# Patient Record
Sex: MALE | Race: WHITE | Employment: FULL TIME | URBAN - METROPOLITAN AREA
[De-identification: names, ages, dates, MRNs, and addresses within clinical notes are randomized per-mention and may not be internally consistent; named-entity substitution may affect disease eponyms.]

---

## 2021-04-16 ENCOUNTER — HOSPITAL ENCOUNTER (OUTPATIENT)
Dept: GENERAL RADIOLOGY | Age: 66
Discharge: HOME | End: 2021-04-16

## 2021-04-16 DIAGNOSIS — R05.9 COUGH: ICD-10-CM

## 2021-04-16 DIAGNOSIS — U07.1 COVID-19: ICD-10-CM

## 2021-04-16 DIAGNOSIS — R06.02 SHORTNESS OF BREATH: ICD-10-CM

## 2021-07-22 PROBLEM — I20.9 ANGINA PECTORIS, UNSPECIFIED (HCC): Status: ACTIVE | Noted: 2021-07-22

## 2022-03-19 PROBLEM — I20.9 ANGINA PECTORIS, UNSPECIFIED (HCC): Status: ACTIVE | Noted: 2021-07-22

## 2022-07-28 ENCOUNTER — NURSE ONLY (OUTPATIENT)
Dept: FAMILY MEDICINE CLINIC | Facility: CLINIC | Age: 67
End: 2022-07-28
Payer: COMMERCIAL

## 2022-07-28 DIAGNOSIS — E55.9 VITAMIN D DEFICIENCY: ICD-10-CM

## 2022-07-28 DIAGNOSIS — E78.00 PURE HYPERCHOLESTEROLEMIA: ICD-10-CM

## 2022-07-28 DIAGNOSIS — I10 PRIMARY HYPERTENSION: ICD-10-CM

## 2022-07-28 DIAGNOSIS — Z12.5 SPECIAL SCREENING FOR MALIGNANT NEOPLASM OF PROSTATE: ICD-10-CM

## 2022-07-28 DIAGNOSIS — Z00.00 LABORATORY EXAMINATION ORDERED AS PART OF A ROUTINE GENERAL MEDICAL EXAMINATION: Primary | ICD-10-CM

## 2022-07-28 LAB
25(OH)D3 SERPL-MCNC: 45.6 NG/ML (ref 30–100)
ALBUMIN SERPL-MCNC: 4.1 G/DL (ref 3.2–4.6)
ALBUMIN/GLOB SERPL: 1.2 {RATIO} (ref 1.2–3.5)
ALP SERPL-CCNC: 68 U/L (ref 50–136)
ALT SERPL-CCNC: 33 U/L (ref 12–65)
ANION GAP SERPL CALC-SCNC: 3 MMOL/L (ref 7–16)
AST SERPL-CCNC: 15 U/L (ref 15–37)
BASOPHILS # BLD: 0.1 K/UL (ref 0–0.2)
BASOPHILS NFR BLD: 2 % (ref 0–2)
BILIRUB SERPL-MCNC: 0.7 MG/DL (ref 0.2–1.1)
BILIRUBIN, URINE, POC: NEGATIVE
BLOOD URINE, POC: NEGATIVE
BUN SERPL-MCNC: 22 MG/DL (ref 8–23)
CALCIUM SERPL-MCNC: 9.3 MG/DL (ref 8.3–10.4)
CHLORIDE SERPL-SCNC: 108 MMOL/L (ref 98–107)
CHOLEST SERPL-MCNC: 142 MG/DL
CO2 SERPL-SCNC: 30 MMOL/L (ref 21–32)
CREAT SERPL-MCNC: 1.1 MG/DL (ref 0.8–1.5)
DIFFERENTIAL METHOD BLD: ABNORMAL
EOSINOPHIL # BLD: 0.2 K/UL (ref 0–0.8)
EOSINOPHIL NFR BLD: 2 % (ref 0.5–7.8)
ERYTHROCYTE [DISTWIDTH] IN BLOOD BY AUTOMATED COUNT: 12.9 % (ref 11.9–14.6)
GLOBULIN SER CALC-MCNC: 3.4 G/DL (ref 2.3–3.5)
GLUCOSE SERPL-MCNC: 104 MG/DL (ref 65–100)
GLUCOSE URINE, POC: NEGATIVE
HCT VFR BLD AUTO: 49.3 % (ref 41.1–50.3)
HDLC SERPL-MCNC: 46 MG/DL (ref 40–60)
HDLC SERPL: 3.1 {RATIO}
HGB BLD-MCNC: 16.2 G/DL (ref 13.6–17.2)
IMM GRANULOCYTES # BLD AUTO: 0 K/UL (ref 0–0.5)
IMM GRANULOCYTES NFR BLD AUTO: 0 % (ref 0–5)
KETONES, URINE, POC: NEGATIVE
LDLC SERPL CALC-MCNC: 72.8 MG/DL
LEUKOCYTE ESTERASE, URINE, POC: NEGATIVE
LYMPHOCYTES # BLD: 2.3 K/UL (ref 0.5–4.6)
LYMPHOCYTES NFR BLD: 30 % (ref 13–44)
MCH RBC QN AUTO: 31.2 PG (ref 26.1–32.9)
MCHC RBC AUTO-ENTMCNC: 32.9 G/DL (ref 31.4–35)
MCV RBC AUTO: 94.8 FL (ref 79.6–97.8)
MONOCYTES # BLD: 1 K/UL (ref 0.1–1.3)
MONOCYTES NFR BLD: 13 % (ref 4–12)
NEUTS SEG # BLD: 4 K/UL (ref 1.7–8.2)
NEUTS SEG NFR BLD: 53 % (ref 43–78)
NITRITE, URINE, POC: NEGATIVE
NRBC # BLD: 0 K/UL (ref 0–0.2)
PH, URINE, POC: 6 (ref 4.6–8)
PLATELET # BLD AUTO: 197 K/UL (ref 150–450)
PMV BLD AUTO: 11.8 FL (ref 9.4–12.3)
POTASSIUM SERPL-SCNC: 4.7 MMOL/L (ref 3.5–5.1)
PROT SERPL-MCNC: 7.5 G/DL (ref 6.3–8.2)
PROTEIN,URINE, POC: NEGATIVE
PSA SERPL-MCNC: 3.6 NG/ML
RBC # BLD AUTO: 5.2 M/UL (ref 4.23–5.6)
SODIUM SERPL-SCNC: 141 MMOL/L (ref 138–145)
SPECIFIC GRAVITY, URINE, POC: 1.01 (ref 1–1.03)
TRIGL SERPL-MCNC: 116 MG/DL (ref 35–150)
TSH, 3RD GENERATION: 1.39 UIU/ML (ref 0.36–3.74)
URINALYSIS CLARITY, POC: CLEAR
URINALYSIS COLOR, POC: YELLOW
UROBILINOGEN, POC: NORMAL
VLDLC SERPL CALC-MCNC: 23.2 MG/DL (ref 6–23)
WBC # BLD AUTO: 7.6 K/UL (ref 4.3–11.1)

## 2022-07-28 PROCEDURE — 81002 URINALYSIS NONAUTO W/O SCOPE: CPT | Performed by: FAMILY MEDICINE

## 2022-08-10 ENCOUNTER — OFFICE VISIT (OUTPATIENT)
Dept: FAMILY MEDICINE CLINIC | Facility: CLINIC | Age: 67
End: 2022-08-10
Payer: COMMERCIAL

## 2022-08-10 VITALS
WEIGHT: 211 LBS | BODY MASS INDEX: 29.54 KG/M2 | SYSTOLIC BLOOD PRESSURE: 130 MMHG | HEIGHT: 71 IN | DIASTOLIC BLOOD PRESSURE: 80 MMHG

## 2022-08-10 DIAGNOSIS — K59.09 CHRONIC CONSTIPATION: ICD-10-CM

## 2022-08-10 DIAGNOSIS — E55.9 VITAMIN D DEFICIENCY: ICD-10-CM

## 2022-08-10 DIAGNOSIS — K21.9 GASTROESOPHAGEAL REFLUX DISEASE WITHOUT ESOPHAGITIS: ICD-10-CM

## 2022-08-10 DIAGNOSIS — Z13.31 SCREENING FOR DEPRESSION: ICD-10-CM

## 2022-08-10 DIAGNOSIS — Z12.11 SPECIAL SCREENING FOR MALIGNANT NEOPLASMS, COLON: ICD-10-CM

## 2022-08-10 DIAGNOSIS — E78.00 PURE HYPERCHOLESTEROLEMIA: ICD-10-CM

## 2022-08-10 DIAGNOSIS — Z12.5 SPECIAL SCREENING FOR MALIGNANT NEOPLASM OF PROSTATE: ICD-10-CM

## 2022-08-10 DIAGNOSIS — I10 PRIMARY HYPERTENSION: ICD-10-CM

## 2022-08-10 DIAGNOSIS — Z00.00 ROUTINE GENERAL MEDICAL EXAMINATION AT A HEALTH CARE FACILITY: Primary | ICD-10-CM

## 2022-08-10 PROCEDURE — 99397 PER PM REEVAL EST PAT 65+ YR: CPT | Performed by: FAMILY MEDICINE

## 2022-08-10 RX ORDER — GLIMEPIRIDE 2 MG/1
1 TABLET ORAL DAILY
COMMUNITY
Start: 2022-06-09

## 2022-08-10 ASSESSMENT — ENCOUNTER SYMPTOMS
DIARRHEA: 0
VOMITING: 0
COUGH: 0
ABDOMINAL DISTENTION: 1
WHEEZING: 0
CONSTIPATION: 0
SHORTNESS OF BREATH: 0
ABDOMINAL PAIN: 0
SORE THROAT: 0
NAUSEA: 0

## 2022-08-10 ASSESSMENT — PATIENT HEALTH QUESTIONNAIRE - PHQ9
1. LITTLE INTEREST OR PLEASURE IN DOING THINGS: 0
SUM OF ALL RESPONSES TO PHQ QUESTIONS 1-9: 0
SUM OF ALL RESPONSES TO PHQ9 QUESTIONS 1 & 2: 0
SUM OF ALL RESPONSES TO PHQ QUESTIONS 1-9: 0
2. FEELING DOWN, DEPRESSED OR HOPELESS: 0

## 2022-08-10 NOTE — PROGRESS NOTES
PROGRESS NOTE    SUBJECTIVE:   Becky Willoughby Sr. is a 78 y.o. male seen for a follow up visit regarding the following chief complaint:     Chief Complaint   Patient presents with    Annual Exam    Discuss Labs           HPI patient presents office today for complete physical without complaints other than chronic constipation having a big belly states he can exercise more than what he is doing at work and states he eats a big meal at suppertime. Past Medical History, Past Surgical History, Family history, Social History, and Medications were all reviewed with the patient today and updated as necessary. Current Outpatient Medications   Medication Sig Dispense Refill    timolol (TIMOPTIC) 0.25 % ophthalmic solution 1 drop daily      aspirin 81 MG EC tablet Take by mouth daily      atorvastatin (LIPITOR) 80 MG tablet Take 80 mg by mouth daily      doxazosin (CARDURA) 4 MG tablet Take 4 mg by mouth daily      famotidine (PEPCID) 40 MG tablet Take 40 mg by mouth daily      latanoprost (XALATAN) 0.005 % ophthalmic solution PLACE ONE DROP INTO BOTH EYES AT BEDTIME      losartan (COZAAR) 50 MG tablet Take 50 mg by mouth daily      metoprolol succinate (TOPROL XL) 25 MG extended release tablet Take 25 mg by mouth daily      nitroGLYCERIN (NITROSTAT) 0.4 MG SL tablet PLACE 1 TABLET (0.4 MG) UNDER THE TONGUE EVERY 5 (FIVE) MINUTES AS NEEDED FOR CHEST PAIN. No current facility-administered medications for this visit.      No Known Allergies  Patient Active Problem List   Diagnosis    CAD (coronary artery disease)    ED (erectile dysfunction)    Angina pectoris, unspecified (HCC)    GERD (gastroesophageal reflux disease)    HTN (hypertension)    Stroke ([facility] [address]    Hyperlipidemia    History of percutaneous coronary intervention    NSTEMI (non-ST elevated myocardial infarction) [facility])     Past Medical History:   Diagnosis Date    CAD (coronary artery disease)     ED (erectile dysfunction)     GERD (gastroesophageal reflux disease)     HTN (hypertension)     Hyperlipidemia     Stroke [facility])      Past Surgical History:   Procedure Laterality Date    [place] CARDIAC SURG PROCEDURE UNLIST      stent procedure 2010     Family History   Problem Relation Age of Onset    No Known Problems Mother      Social History     Tobacco Use    Smoking status: Former     Packs/day: 1.00     Years: 20.00     Pack years: 20.00     Types: Cigarettes     Start date: 1977     Quit date: 2008     Years since quittin.9     Passive exposure: Never    Smokeless tobacco: Never   Substance Use Topics    Alcohol use: No     Alcohol/week: 0.0 standard drinks         Review of Systems   Constitutional:  Negative for chills and fever. HENT:  Negative for sore throat. Eyes:  Negative for visual disturbance. Respiratory:  Negative for cough, shortness of breath and wheezing. Cardiovascular:  Negative for chest pain and palpitations. Gastrointestinal:  Positive for abdominal distention. Negative for abdominal pain, constipation, diarrhea, nausea and vomiting. Endocrine: Negative for cold intolerance and heat intolerance. Genitourinary:  Negative for decreased urine volume, dysuria, penile discharge and testicular pain. Musculoskeletal:  Negative for arthralgias and myalgias. Skin:  Negative for rash. Neurological:  Negative for weakness and light-headedness. Psychiatric/Behavioral: Negative. OBJECTIVE:  /80 (Site: Left Upper Arm, Position: Sitting, Cuff Size: Large Adult)   Ht 5' 11\" (1.803 m)   Wt 211 lb (95.7 kg)   BMI 29.43 kg/m²      Physical Exam  Vitals and nursing note reviewed. Constitutional:       Appearance: Normal appearance. HENT:      Head: Normocephalic and atraumatic.       Right Ear: Tympanic membrane normal.      Left Ear: Tympanic membrane normal.      Nose: Nose normal.      Mouth/Throat:      Mouth: Mucous membranes are moist.      Pharynx: No oropharyngeal exudate or posterior oropharyngeal erythema. Eyes:      Extraocular Movements: Extraocular movements intact. Conjunctiva/sclera: Conjunctivae normal.      Pupils: Pupils are equal, round, and reactive to light. Cardiovascular:      Rate and Rhythm: Normal rate and regular rhythm. Pulses: Normal pulses. Heart sounds: Normal heart sounds. Pulmonary:      Effort: Pulmonary effort is normal.      Breath sounds: Normal breath sounds. Abdominal:      General: Abdomen is flat. Bowel sounds are normal.      Palpations: Abdomen is soft. Tenderness: There is no abdominal tenderness. Hernia: A hernia is present. Hernia is present in the ventral area. Genitourinary:     Penis: Normal.       Testes: Normal.      Prostate: Normal.      Rectum: Normal. Guaiac result negative. Musculoskeletal:         General: Normal range of motion. Cervical back: Normal range of motion and neck supple. Skin:     General: Skin is warm and dry. Capillary Refill: Capillary refill takes less than 2 seconds. Neurological:      General: No focal deficit present. Mental Status: He is alert and oriented to person, place, and time. Psychiatric:         Mood and Affect: Mood normal.         Behavior: Behavior normal.         Thought Content: Thought content normal.         Judgment: Judgment normal.        Medical problems and test results were reviewed with the patient today.      Recent Results (from the past 672 hour(s))   AMB POC URINALYSIS DIP STICK MANUAL W/O MICRO    Collection Time: 07/28/22 11:00 AM   Result Value Ref Range    Color (UA POC) Yellow     Clarity (UA POC) Clear     Glucose, Urine, POC Negative Negative    Bilirubin, Urine, POC Negative Negative    Ketones, Urine, POC Negative Negative    Specific Gravity, Urine, POC 1.015 1.001 - 1.035    Blood (UA POC) Negative Negative    pH, Urine, POC 6.0 4.6 - 8.0    Protein, Urine, POC Negative Negative    Urobilinogen, POC Normal     Nitrite, Urine, POC Negative Negative    Leukocyte Esterase, Urine, POC Negative Negative   Vitamin D 25 Hydroxy    Collection Time: 07/28/22 12:02 PM   Result Value Ref Range    Vit D, 25-Hydroxy 45.6 30.0 - 100.0 ng/mL   TSH    Collection Time: 07/28/22 12:02 PM   Result Value Ref Range    TSH, 3RD GENERATION 1.390 0.358 - 3.740 uIU/mL   PSA Screening    Collection Time: 07/28/22 12:02 PM   Result Value Ref Range    PSA 3.6 <4.0 ng/mL   Lipid Panel    Collection Time: 07/28/22 12:02 PM   Result Value Ref Range    Cholesterol, Total 142 <200 MG/DL    Triglycerides 116 35 - 150 MG/DL    HDL 46 40 - 60 MG/DL    LDL Calculated 72.8 <100 MG/DL    VLDL Cholesterol Calculated 23.2 (H) 6.0 - 23.0 MG/DL    Chol/HDL Ratio 3.1     CBC with Auto Differential    Collection Time: 07/28/22 12:02 PM   Result Value Ref Range    WBC 7.6 4.3 - 11.1 K/uL    RBC 5.20 4.23 - 5.6 M/uL    Hemoglobin 16.2 13.6 - 17.2 g/dL    Hematocrit 49.3 41.1 - 50.3 %    MCV 94.8 79.6 - 97.8 FL    MCH 31.2 26.1 - 32.9 PG    MCHC 32.9 31.4 - 35.0 g/dL    RDW 12.9 11.9 - 14.6 %    Platelets 803 807 - 749 K/uL    MPV 11.8 9.4 - 12.3 FL    nRBC 0.00 0.0 - 0.2 K/uL    Differential Type AUTOMATED      Seg Neutrophils 53 43 - 78 %    Lymphocytes 30 13 - 44 %    Monocytes 13 (H) 4.0 - 12.0 %    Eosinophils % 2 0.5 - 7.8 %    Basophils 2 0.0 - 2.0 %    Immature Granulocytes 0 0.0 - 5.0 %    Segs Absolute 4.0 1.7 - 8.2 K/UL    Absolute Lymph # 2.3 0.5 - 4.6 K/UL    Absolute Mono # 1.0 0.1 - 1.3 K/UL    Absolute Eos # 0.2 0.0 - 0.8 K/UL    Basophils Absolute 0.1 0.0 - 0.2 K/UL    Absolute Immature Granulocyte 0.0 0.0 - 0.5 K/UL   Comprehensive Metabolic Panel    Collection Time: 07/28/22 12:02 PM   Result Value Ref Range    Sodium 141 138 - 145 mmol/L    Potassium 4.7 3.5 - 5.1 mmol/L    Chloride 108 (H) 98 - 107 mmol/L    CO2 30 21 - 32 mmol/L    Anion Gap 3 (L) 7 - 16 mmol/L    Glucose 104 (H) 65 - 100 mg/dL    BUN 22 8 - 23 MG/DL    Creatinine 1.10 0.8 - 1.5 MG/DL GFR African American >60 >60 ml/min/1.73m2    GFR Non- >60 >60 ml/min/1.73m2    Calcium 9.3 8.3 - 10.4 MG/DL    Total Bilirubin 0.7 0.2 - 1.1 MG/DL    ALT 33 12 - 65 U/L    AST 15 15 - 37 U/L    Alk Phosphatase 68 50 - 136 U/L    Total Protein 7.5 6.3 - 8.2 g/dL    Albumin 4.1 3.2 - 4.6 g/dL    Globulin 3.4 2.3 - 3.5 g/dL    Albumin/Globulin Ratio 1.2 1.2 - 3.5         ASSESSMENT and PLAN    Visit Diagnoses and Associated Orders       Routine general medical examination at a health care facility    -  Primary    Hemoglobin A1C [62189 Custom]   - Future Order    PSA Screening [ Custom]   - Future Order    AMB POC URINALYSIS DIP STICK MANUAL W/O MICRO [61079 CPT(R)]   - Future Order    Hepatitis C Antibody [79212 Custom]   - Future Order    TSH [25789 Custom]   - Future Order    Vitamin D 25 Hydroxy [90727 Custom]   - Future Order    Lipid Panel [95532 Custom]   - Future Order    Comprehensive Metabolic Panel [72343 Custom]   - Future Order    AMB POC KTY COMPLETE CBC [93335 CPT(R)]   - Future Order    HIV 1/2 Ag/Ab, 4TH Generation,W Rflx Confirm [97520 CPT(R)]   - Future Order         Primary hypertension             Pure hypercholesterolemia             Special screening for malignant neoplasm of prostate             Vitamin D deficiency             Gastroesophageal reflux disease without esophagitis             Screening for depression             Special screening for malignant neoplasms, [facility] - Colonoscopy [XLU191 Custom]           Chronic constipation             ORDERS WITHOUT AN ASSOCIATED DIAGNOSIS    timolol (TIMOPTIC) 0.25 % ophthalmic solution [23947]                  Diagnosis Orders   1.  Routine general medical examination at a health care facility  Hemoglobin A1C    PSA Screening    AMB POC URINALYSIS DIP STICK MANUAL W/O MICRO    Hepatitis C Antibody    TSH    Vitamin D 25 Hydroxy    Lipid Panel    Comprehensive Metabolic Panel    AMB POC

## 2023-03-01 ENCOUNTER — PREP FOR PROCEDURE (OUTPATIENT)
Dept: SURGERY | Age: 68
End: 2023-03-01

## 2023-03-01 ENCOUNTER — CLINICAL DOCUMENTATION (OUTPATIENT)
Dept: SURGERY | Age: 68
End: 2023-03-01

## 2023-03-01 PROBLEM — Z12.11 ENCOUNTER FOR SCREENING COLONOSCOPY: Status: ACTIVE | Noted: 2023-03-01

## 2023-03-01 NOTE — PROGRESS NOTES
Pt has no family history of colon cancer  Pt is not taking anticoagulation  Pt has no previous colonoscopies discoverable through chart review    I have reviewed the patient's chart and consider the patient an acceptable risk for screening colonoscopy without a formal office visit. We will contact the patient to give the details of the bowel prep and to schedule screening colonoscopy in the near future. Once the colonoscopy has been completed, the Health Maintenance will be updated accordingly.      AGUSTIN Lopez - NP

## 2023-03-31 PROBLEM — Z12.11 ENCOUNTER FOR SCREENING COLONOSCOPY: Status: RESOLVED | Noted: 2023-03-01 | Resolved: 2023-03-31

## 2023-05-08 ENCOUNTER — PREP FOR PROCEDURE (OUTPATIENT)
Dept: SURGERY | Age: 68
End: 2023-05-08

## 2023-05-09 RX ORDER — SODIUM CHLORIDE 0.9 % (FLUSH) 0.9 %
5-40 SYRINGE (ML) INJECTION EVERY 12 HOURS SCHEDULED
Status: CANCELLED | OUTPATIENT
Start: 2023-05-09

## 2023-05-09 RX ORDER — SODIUM CHLORIDE 9 MG/ML
INJECTION, SOLUTION INTRAVENOUS PRN
Status: CANCELLED | OUTPATIENT
Start: 2023-05-09

## 2023-05-09 RX ORDER — SODIUM CHLORIDE 0.9 % (FLUSH) 0.9 %
5-40 SYRINGE (ML) INJECTION PRN
Status: CANCELLED | OUTPATIENT
Start: 2023-05-09

## 2023-06-21 ENCOUNTER — ANESTHESIA EVENT (OUTPATIENT)
Dept: ENDOSCOPY | Age: 68
End: 2023-06-21
Payer: MEDICARE

## 2023-06-21 RX ORDER — ONDANSETRON 2 MG/ML
4 INJECTION INTRAMUSCULAR; INTRAVENOUS
Status: CANCELLED | OUTPATIENT
Start: 2023-06-21 | End: 2023-06-22

## 2023-06-21 RX ORDER — DIPHENHYDRAMINE HYDROCHLORIDE 50 MG/ML
12.5 INJECTION INTRAMUSCULAR; INTRAVENOUS
Status: CANCELLED | OUTPATIENT
Start: 2023-06-21 | End: 2023-06-22

## 2023-06-21 RX ORDER — ACETAMINOPHEN 500 MG
1000 TABLET ORAL ONCE
Status: CANCELLED | OUTPATIENT
Start: 2023-06-21 | End: 2023-06-21

## 2023-06-21 RX ORDER — OXYCODONE HYDROCHLORIDE 5 MG/1
5 TABLET ORAL
Status: CANCELLED | OUTPATIENT
Start: 2023-06-21 | End: 2023-06-22

## 2023-06-22 ENCOUNTER — HOSPITAL ENCOUNTER (OUTPATIENT)
Age: 68
Setting detail: OUTPATIENT SURGERY
Discharge: HOME | End: 2023-06-22
Attending: SURGERY | Admitting: SURGERY
Payer: MEDICARE

## 2023-06-22 ENCOUNTER — ANESTHESIA (OUTPATIENT)
Dept: ENDOSCOPY | Age: 68
End: 2023-06-22
Payer: MEDICARE

## 2023-06-22 VITALS
WEIGHT: 203.1 LBS | HEIGHT: 71 IN | RESPIRATION RATE: 18 BRPM | TEMPERATURE: 97.9 F | BODY MASS INDEX: 28.43 KG/M2 | HEART RATE: 40 BPM | DIASTOLIC BLOOD PRESSURE: 71 MMHG | SYSTOLIC BLOOD PRESSURE: 153 MMHG | OXYGEN SATURATION: 97 %

## 2023-06-22 PROCEDURE — 93005 ELECTROCARDIOGRAM TRACING: CPT | Performed by: ANESTHESIOLOGY

## 2023-06-22 PROCEDURE — 2709999900 HC NON-CHARGEABLE SUPPLY: Performed by: SURGERY

## 2023-06-22 PROCEDURE — 2580000003 HC RX 258: Performed by: ANESTHESIOLOGY

## 2023-06-22 PROCEDURE — 88305 TISSUE EXAM BY PATHOLOGIST: CPT

## 2023-06-22 PROCEDURE — 3609010600 HC COLONOSCOPY POLYPECTOMY SNARE/COLD BIOPSY: Performed by: SURGERY

## 2023-06-22 PROCEDURE — 7100000010 HC PHASE II RECOVERY - FIRST 15 MIN: Performed by: SURGERY

## 2023-06-22 PROCEDURE — 3609010400 HC COLONOSCOPY POLYPECTOMY HOT BIOPSY: Performed by: SURGERY

## 2023-06-22 PROCEDURE — 6360000002 HC RX W HCPCS

## 2023-06-22 PROCEDURE — 7100000011 HC PHASE II RECOVERY - ADDTL 15 MIN: Performed by: SURGERY

## 2023-06-22 PROCEDURE — 3700000000 HC ANESTHESIA ATTENDED CARE: Performed by: SURGERY

## 2023-06-22 PROCEDURE — 3700000001 HC ADD 15 MINUTES (ANESTHESIA): Performed by: SURGERY

## 2023-06-22 RX ORDER — PROPOFOL 10 MG/ML
INJECTION, EMULSION INTRAVENOUS CONTINUOUS PRN
Status: DISCONTINUED | OUTPATIENT
Start: 2023-06-22 | End: 2023-06-22 | Stop reason: SDUPTHER

## 2023-06-22 RX ORDER — MIDAZOLAM HYDROCHLORIDE 2 MG/2ML
2 INJECTION, SOLUTION INTRAMUSCULAR; INTRAVENOUS
Status: DISCONTINUED | OUTPATIENT
Start: 2023-06-22 | End: 2023-06-22 | Stop reason: HOSPADM

## 2023-06-22 RX ORDER — SODIUM CHLORIDE 0.9 % (FLUSH) 0.9 %
5-40 SYRINGE (ML) INJECTION PRN
Status: DISCONTINUED | OUTPATIENT
Start: 2023-06-22 | End: 2023-06-22 | Stop reason: HOSPADM

## 2023-06-22 RX ORDER — SODIUM CHLORIDE 9 MG/ML
INJECTION, SOLUTION INTRAVENOUS PRN
Status: DISCONTINUED | OUTPATIENT
Start: 2023-06-22 | End: 2023-06-22 | Stop reason: HOSPADM

## 2023-06-22 RX ORDER — FENTANYL CITRATE 50 UG/ML
100 INJECTION, SOLUTION INTRAMUSCULAR; INTRAVENOUS
Status: DISCONTINUED | OUTPATIENT
Start: 2023-06-22 | End: 2023-06-22 | Stop reason: HOSPADM

## 2023-06-22 RX ORDER — SODIUM CHLORIDE 0.9 % (FLUSH) 0.9 %
5-40 SYRINGE (ML) INJECTION EVERY 12 HOURS SCHEDULED
Status: DISCONTINUED | OUTPATIENT
Start: 2023-06-22 | End: 2023-06-22 | Stop reason: HOSPADM

## 2023-06-22 RX ORDER — SODIUM CHLORIDE, SODIUM LACTATE, POTASSIUM CHLORIDE, CALCIUM CHLORIDE 600; 310; 30; 20 MG/100ML; MG/100ML; MG/100ML; MG/100ML
INJECTION, SOLUTION INTRAVENOUS CONTINUOUS
Status: DISCONTINUED | OUTPATIENT
Start: 2023-06-22 | End: 2023-06-22 | Stop reason: HOSPADM

## 2023-06-22 RX ORDER — PROPOFOL 10 MG/ML
INJECTION, EMULSION INTRAVENOUS PRN
Status: DISCONTINUED | OUTPATIENT
Start: 2023-06-22 | End: 2023-06-22 | Stop reason: SDUPTHER

## 2023-06-22 RX ADMIN — PROPOFOL 40 MG: 10 INJECTION, EMULSION INTRAVENOUS at 12:52

## 2023-06-22 RX ADMIN — SODIUM CHLORIDE, SODIUM LACTATE, POTASSIUM CHLORIDE, AND CALCIUM CHLORIDE: 600; 310; 30; 20 INJECTION, SOLUTION INTRAVENOUS at 11:33

## 2023-06-22 RX ADMIN — PROPOFOL 40 MG: 10 INJECTION, EMULSION INTRAVENOUS at 12:54

## 2023-06-22 RX ADMIN — PROPOFOL 150 MCG/KG/MIN: 10 INJECTION, EMULSION INTRAVENOUS at 12:52

## 2023-06-22 RX ADMIN — SODIUM CHLORIDE, SODIUM LACTATE, POTASSIUM CHLORIDE, AND CALCIUM CHLORIDE: 600; 310; 30; 20 INJECTION, SOLUTION INTRAVENOUS at 12:39

## 2023-06-22 NOTE — PROGRESS NOTES
1340-Heart rate dropped to 37. Gina Solis reported pt had a heart block, PVC's and PAC's during the procedure. Dr. German Duarte notified. 1359-Dr. Hurt notified of EKG computer read that states, \"sinus ирина with 2nd degree AV block\"  1424-Never had symptoms of bradycardia. Cardiologist reports ok to discharge per Dr. German Duarte with instructions to follow up with their cardiologist tomorrow/ASAP. 1450-Wife called primary cardiologist office. Reports appointment for June 29th. Dr. German Duarte notified. Ok to discharge. 1508-Discharge instructions were reviewed with patient and pts wife, Magdi Ruffin. An opportunity was given for questions. Patient verbalized understanding, and has no questions at this time. 1515-To lobby via wheelchair accompanied by staff. Discharged to home via private vehicle.

## 2023-06-22 NOTE — H&P
[facility] [address]  [place], [address] [facility]  [phone]     History and Physical/Surgical Consult   Tyrone Chavez Sr. Admit date: 2023    MRN: [id number]     : 1955     Age: 80 y.o.          2023 12:42 PM    Subjective/HPI:   This patient is a 80 y.o. here today for his first colonoscopy. No problems. No family history of colon cancer. Review of Systems  Pertinent items are noted in HPI. Past Medical History:   Diagnosis Date    CAD (coronary artery disease)     ED (erectile dysfunction)     GERD (gastroesophageal reflux disease)     controlled with medication    HTN (hypertension)     managed with medication    Hyperlipidemia     Stroke ([address]       Past Surgical History:   Procedure Laterality Date    INGUINAL HERNIA REPAIR Right     OK UNLISTED PROCEDURE CARDIAC SURGERY      stent procedure       No Known Allergies   Social History     Tobacco Use    Smoking status: Former     Packs/day: 1.00     Years: 20.00     Pack years: 20.00     Types: Cigarettes     Start date: 1977     Quit date: 2008     Years since quittin.7     Passive exposure: Never    Smokeless tobacco: Never   Substance Use Topics    Alcohol use: No     Alcohol/week: 0.0 standard drinks      Social History     Social History Narrative    Not on file     Family History   Problem Relation Age of Onset    No Known Problems Mother       Prior to Admission Medications   Prescriptions Last Dose Informant Patient Reported? Taking?    Cholecalciferol (VITAMIN D-3 PO) 2023  Yes Yes   Sig: Take by mouth   aspirin 81 MG EC tablet 2023  Yes No   Sig: Take by mouth daily   atorvastatin (LIPITOR) 80 MG tablet 2023  Yes No   Sig: Take 1 tablet by mouth daily   doxazosin (CARDURA) 4 MG tablet 2023  Yes No   Sig: Take 1 tablet by mouth daily   famotidine (PEPCID) 40 MG tablet 2023  Yes No   Sig: Take 1 tablet by mouth daily   latanoprost (XALATAN) 0.005

## 2023-06-22 NOTE — DISCHARGE INSTRUCTIONS
Gastrointestinal Colonoscopy/Flexible Sigmoidoscopy - Lower Exam Discharge Instructions  Call Dr. Jason Russell at [phone] for any problems or questions. Contact the doctors office for follow up appointment as directed  Medication may cause drowsiness for several hours, therefore, do not drive or operate machinery for remainder of the day. No alcohol today. Ordinarily, you may resume regular diet and activity after exam unless otherwise specified by your physician. Because of air put into your colon during exam, you may experience some abdominal distension, relieved by the passage of gas, for several hours. Contact your physician if you have any of the following:  Excessive amount of bleeding - large amount when having a bowel movement. Occasional specks of blood with bowel movement would not be unusual.  Severe abdominal pain  Fever or Chills  Polyp Removal - follow these additional instructions  Take Metamucil - 1 tablespoon in juice every morning for 3 days  No Aspirin, Advil, Aleve, Nuprin, Ibuprofen, or medications that contain these drugs for 2 weeks. Any additional instructions:      Monitor for cardiac symptoms: Shortness of breath, chest pain, dizziness or any other symptoms of illness. Seek medical help urgently if  symptoms present. Follow up pathology as directed. Heart Blocks: Care Instructions  Your Care Instructions     A heart block is a problem with your heart's electrical system. Normally, a small area of the heart (sinus node) creates the electrical signals that cause the heart to beat in a timed and regular way. A heart block occurs when the signal is blocked. This disrupts the heartbeat. A heart block does not mean that blood flow to the heart is blocked. Heart block can be caused by many things that affect the electrical system of the heart. These things include the effects of aging, certain medicines, and another health condition. There are three types of heart blocks.  In a

## 2023-06-22 NOTE — BRIEF OP NOTE
Brief Postoperative Note      Patient: Mervat Hightower Sr. YOB: 1955  MRN: [id number]    Date of Procedure: 6/22/2023    Pre-Op Diagnosis Codes:     * Encounter for screening colonoscopy [Z12.11]    Post-Op Diagnosis:  Polyp hepatic flexure.  Polyp rectum       Procedure(s):  COLONOSCOPY POLYPECTOMY  BIOPSY    Surgeon(s):  Sharon Jo MD    Assistant:  * No surgical staff found *    Anesthesia: Monitor Anesthesia Care    Estimated Blood Loss (mL): Minimal    Complications: None    Specimens:   ID Type Source Tests Collected by Time Destination   A : ascending colon polyp Tissue Tissue SURGICAL PATHOLOGY Sharon Jo MD 6/22/2023 1312    B : rectal polyp Tissue Tissue SURGICAL PATHOLOGY Sharon Jo MD 6/22/2023 1318        Implants:  * No implants in log *      Drains: * No LDAs found *    Findings: As above      Electronically signed by Ruben Gill MD on 6/22/2023 at 1:25 PM

## 2023-06-22 NOTE — ANESTHESIA POSTPROCEDURE EVALUATION
Department of Anesthesiology  Postprocedure Note    Patient: Etta Almazan Sr. MRN: [id number]  YOB: 1955  Date of evaluation: 6/22/2023      Procedure Summary     Date: 06/22/23 [place] [facility] [place] [place] [facility] [place]    Anesthesia Start: 9219 Anesthesia Stop: 4791    Procedure: COLONOSCOPY POLYPECTOMY  BIOPSY Diagnosis:       Encounter for screening colonoscopy      (Encounter for screening colonoscopy [Z12.11])    Surgeons: Stefani Pinedo MD Responsible Provider: Jodie Wiley MD    Anesthesia Type: TIVA ASA Status: 3          Anesthesia Type: No value filed. Janeth Phase I:      Janeth Phase II:        Anesthesia Post Evaluation    Patient location during evaluation: PACU  Patient participation: complete - patient participated  Level of consciousness: awake and alert  Airway patency: patent  Nausea & Vomiting: no nausea and no vomiting  Complications: no  Cardiovascular status: hemodynamically stable  Respiratory status: acceptable, nonlabored ventilation and spontaneous ventilation  Hydration status: euvolemic  Comments: /75   Pulse (!) 43   Temp 97.9 °F (36.6 °C) (Temporal)   Resp 18   Ht 5' 11\" (1.803 m)   Wt 203 lb 1.6 oz (92.1 kg)   SpO2 100%   BMI 28.33 kg/m²   Mr. Brad Kaminski had Bradycardia with missed beats. EKG obtained and send to Cardiologist along with real time strips. It appears he has 2nd Degree Motitz 1 which Dr. Lori Hernandez thought was due to vagal tone. Pt was asymptomatic throughout and dropped beats disappeared with stimulation and increased HR. Plan to discharge home and request that he follows up with primary cardiologist at [facility] in next 24-48 hrs.      Multimodal analgesia pain management approach

## 2023-06-22 NOTE — OP NOTE
Operative Note      Patient: Ela Juarez Sr.   YOB: 1955  MRN: [id number]    Date of Procedure: 6/22/2023    Pre-Op Diagnosis Codes:     * Encounter for screening colonoscopy [Z12.11]    Post-Op Diagnosis:  Polyp hepatic flexure , polyp rectum       Procedure(s):  COLONOSCOPY POLYPECTOMY  BIOPSY    Surgeon(s):  Natalia Montes MD    Assistant:   * No surgical staff found *    Anesthesia: Monitor Anesthesia Care    Estimated Blood Loss (mL): Minimal    Complications: None    Specimens:   ID Type Source Tests Collected by Time Destination   A : ascending colon polyp Tissue Tissue SURGICAL PATHOLOGY Natalia Montes MD 6/22/2023 1312    B : rectal polyp Tissue Tissue SURGICAL PATHOLOGY Natalia Montes MD 6/22/2023 1318        Implants:  * No implants in log *      Drains: * No LDAs found *    Findings: As above        Detailed Description of Procedure:   Dictated   [id number]    Electronically signed by Jessica Enrique MD on 6/22/2023 at 1:26 PM

## 2023-06-22 NOTE — ANESTHESIA PRE PROCEDURE
Department of Anesthesiology  Preprocedure Note       Name:  Sandee Hicks Sr.   Age:  80 y.o.  :  1955                                          MRN:  [id number]         Date:  2023      Surgeon: Kiran Garcia):  Benedetta Kawasaki., MD    Procedure: Procedure(s):  COLORECTAL CANCER SCREENING, NOT HIGH RISK    Medications prior to admission:   Prior to Admission medications    Medication Sig Start Date End Date Taking? Authorizing Provider   Cholecalciferol (VITAMIN D-3 PO) Take by mouth   Yes Historical Provider, MD   timolol (TIMOPTIC) 0.25 % ophthalmic solution 1 drop daily 22   Historical Provider, MD   aspirin 81 MG EC tablet Take by mouth daily    Ar Automatic Reconciliation   atorvastatin (LIPITOR) 80 MG tablet Take 1 tablet by mouth daily 21   Ar Automatic Reconciliation   doxazosin (CARDURA) 4 MG tablet Take 1 tablet by mouth daily 21   Ar Automatic Reconciliation   famotidine (PEPCID) 40 MG tablet Take 1 tablet by mouth daily 21   Ar Automatic Reconciliation   latanoprost (XALATAN) 0.005 % ophthalmic solution PLACE ONE DROP INTO BOTH EYES AT BEDTIME 19   Ar Automatic Reconciliation   losartan (COZAAR) 50 MG tablet Take 1 tablet by mouth daily 21   Ar Automatic Reconciliation   metoprolol succinate (TOPROL XL) 25 MG extended release tablet Take 1 tablet by mouth daily 21   Ar Automatic Reconciliation   nitroGLYCERIN (NITROSTAT) 0.4 MG SL tablet PLACE 1 TABLET (0.4 MG) UNDER THE TONGUE EVERY 5 (FIVE) MINUTES AS NEEDED FOR CHEST PAIN. 21   Ar Automatic Reconciliation       Current medications:    No current facility-administered medications for this encounter.        Allergies:  No Known Allergies    Problem List:    Patient Active Problem List   Diagnosis Code    CAD (coronary artery disease) I25.10    ED (erectile dysfunction) N52.9    [facility], [facility] I20.9    GERD (gastroesophageal reflux disease) K21.9    HTN (hypertension) I10

## 2023-06-23 LAB
EKG ATRIAL RATE: 48 BPM
EKG DIAGNOSIS: NORMAL
EKG P AXIS: 5 DEGREES
EKG Q-T INTERVAL: 444 MS
EKG QRS DURATION: 74 MS
EKG QTC CALCULATION (BAZETT): 338 MS
EKG R AXIS: -35 DEGREES
EKG T AXIS: 19 DEGREES
EKG VENTRICULAR RATE: 35 BPM

## 2023-06-23 NOTE — OP NOTE
[place]  OPERATIVE REPORT    Name:  Toya Fan  MR#:  [id number]  :  1955  ACCOUNT #:  [de-identified]  DATE OF SERVICE:  2023    PREOPERATIVE DIAGNOSIS:  Screening colonoscopy. POSTOPERATIVE DIAGNOSES:  1. Polyp, hepatic flexure. 2.  Polyp of the rectum. PROCEDURE PERFORMED:  Colonoscopy with polypectomy x2. SURGEON:  Liu Christie. Zohra Myers MD    ASSISTANT:  None. ANESTHESIA:  MAC.    COMPLICATIONS:  None. SPECIMENS REMOVED:  1. Polyp of hepatic flexure. 2.  Polyp of the rectum. IMPLANTS:  ***. ESTIMATED BLOOD LOSS:  Minimal.    COUNTS:  Correct. PROCEDURE:  After the patient was brought into the room, time-out was carried out and all were in agreement. He was rolled onto his left side and MAC anesthesia administered. Scope was inserted into the rectum. Anus and rectal vault normal.  Scope passed to the level of the cecum without difficulty. Slow withdrawal was performed. At the hepatic flexure, a small polyp was seen. Using cold biopsy forceps, this was removed. Tissue was retrieved and sent to Pathology. Slow withdrawal through the hepatic flexure, transverse colon normal.  Descending colon normal.  Sigmoid colon normal.  Within the rectum, there was another small polyp found. Multiple bites were taken with the forceps and this polyp was sent to Pathology as well. At this time, as much of the air as possible was evacuated and the scope was removed. The patient tolerated the procedure well.       MD TRINIDAD [id number]  D:  2023 13:29  T:  2023 22:30  JOB #:  [id number]  CC:   (Delete CC field if not dictated.)

## 2023-08-23 ENCOUNTER — NURSE ONLY (OUTPATIENT)
Dept: FAMILY MEDICINE CLINIC | Facility: CLINIC | Age: 68
End: 2023-08-23
Payer: MEDICARE

## 2023-08-23 DIAGNOSIS — Z00.00 ROUTINE GENERAL MEDICAL EXAMINATION AT A HEALTH CARE FACILITY: ICD-10-CM

## 2023-08-23 LAB
25(OH)D3 SERPL-MCNC: 38.9 NG/ML (ref 30–100)
ALBUMIN SERPL-MCNC: 3.7 G/DL (ref 3.2–4.6)
ALBUMIN/GLOB SERPL: 1.1 (ref 0.4–1.6)
ALP SERPL-CCNC: 62 U/L (ref 50–136)
ALT SERPL-CCNC: 27 U/L (ref 12–65)
ANION GAP SERPL CALC-SCNC: 2 MMOL/L (ref 2–11)
AST SERPL-CCNC: 16 U/L (ref 15–37)
BILIRUB SERPL-MCNC: 0.8 MG/DL (ref 0.2–1.1)
BILIRUBIN, URINE, POC: NEGATIVE
BLOOD URINE, POC: NEGATIVE
BUN SERPL-MCNC: 21 MG/DL (ref 8–23)
CALCIUM SERPL-MCNC: 9.4 MG/DL (ref 8.3–10.4)
CHLORIDE SERPL-SCNC: 111 MMOL/L (ref 101–110)
CHOLEST SERPL-MCNC: 138 MG/DL
CO2 SERPL-SCNC: 28 MMOL/L (ref 21–32)
CREAT SERPL-MCNC: 1.1 MG/DL (ref 0.8–1.5)
GLOBULIN SER CALC-MCNC: 3.4 G/DL (ref 2.8–4.5)
GLUCOSE SERPL-MCNC: 124 MG/DL (ref 65–100)
GLUCOSE URINE, POC: NEGATIVE
GRANS ABSOLUTE, POC: 4 K/UL
GRANULOCYTES %, POC: 60.7 %
HCV AB SER QL: NONREACTIVE
HDLC SERPL-MCNC: 46 MG/DL (ref 40–60)
HDLC SERPL: 3
HEMATOCRIT, POC: 48 %
HEMOGLOBIN, POC: 15.9 G/DL
HIV 1+2 AB+HIV1 P24 AG SERPL QL IA: NONREACTIVE
HIV 1/2 RESULT COMMENT: NORMAL
KETONES, URINE, POC: NEGATIVE
LDLC SERPL CALC-MCNC: 71.4 MG/DL
LEUKOCYTE ESTERASE, URINE, POC: NEGATIVE
LYMPHOCYTE %, POC: 31.5 %
LYMPHS ABSOLUTE, POC: 2.1 K/UL
MCH, POC: 31.5 PG (ref 20–?)
MCHC, POC: 33.1
MCV, POC: 95
MONOCYTE %, POC: 7.8 %
MONOCYTE, ABSOLUTE POC: 0.5 K/UL
MPV, POC: 9.3 FL
NITRITE, URINE, POC: NEGATIVE
PH, URINE, POC: 5.5 (ref 4.6–8)
PLATELET COUNT, POC: 181 K/UL
POTASSIUM SERPL-SCNC: 4 MMOL/L (ref 3.5–5.1)
PROT SERPL-MCNC: 7.1 G/DL (ref 6.3–8.2)
PROTEIN,URINE, POC: NEGATIVE
PSA SERPL-MCNC: 2.9 NG/ML
RBC, POC: 5.05 M/UL
RDW, POC: 12.4 %
SODIUM SERPL-SCNC: 141 MMOL/L (ref 133–143)
SPECIFIC GRAVITY, URINE, POC: 1.02 (ref 1–1.03)
TRIGL SERPL-MCNC: 103 MG/DL (ref 35–150)
TSH, 3RD GENERATION: 1.27 UIU/ML (ref 0.36–3.74)
URINALYSIS CLARITY, POC: CLEAR
URINALYSIS COLOR, POC: YELLOW
UROBILINOGEN, POC: NORMAL
VLDLC SERPL CALC-MCNC: 20.6 MG/DL (ref 6–23)
WBC, POC: 6.6 K/UL

## 2023-08-23 PROCEDURE — 81003 URINALYSIS AUTO W/O SCOPE: CPT | Performed by: FAMILY MEDICINE

## 2023-08-23 PROCEDURE — 36415 COLL VENOUS BLD VENIPUNCTURE: CPT | Performed by: FAMILY MEDICINE

## 2023-08-23 PROCEDURE — 85025 COMPLETE CBC W/AUTO DIFF WBC: CPT | Performed by: FAMILY MEDICINE

## 2023-08-24 LAB
EST. AVERAGE GLUCOSE BLD GHB EST-MCNC: 120 MG/DL
HBA1C MFR BLD: 5.8 % (ref 4.8–5.6)

## 2023-09-07 ENCOUNTER — OFFICE VISIT (OUTPATIENT)
Dept: FAMILY MEDICINE CLINIC | Facility: CLINIC | Age: 68
End: 2023-09-07
Payer: MEDICARE

## 2023-09-07 VITALS — BODY MASS INDEX: 28.33 KG/M2 | HEIGHT: 71 IN

## 2023-09-07 DIAGNOSIS — K21.9 GASTROESOPHAGEAL REFLUX DISEASE WITHOUT ESOPHAGITIS: ICD-10-CM

## 2023-09-07 DIAGNOSIS — Z00.00 ROUTINE GENERAL MEDICAL EXAMINATION AT A HEALTH CARE FACILITY: Primary | ICD-10-CM

## 2023-09-07 DIAGNOSIS — Z00.00 MEDICARE WELCOME EXAM: ICD-10-CM

## 2023-09-07 DIAGNOSIS — I21.4 NSTEMI (NON-ST ELEVATED MYOCARDIAL INFARCTION) (HCC): ICD-10-CM

## 2023-09-07 DIAGNOSIS — N52.9 ERECTILE DYSFUNCTION, UNSPECIFIED ERECTILE DYSFUNCTION TYPE: ICD-10-CM

## 2023-09-07 DIAGNOSIS — I10 PRIMARY HYPERTENSION: ICD-10-CM

## 2023-09-07 DIAGNOSIS — Z87.891 PERSONAL HISTORY OF TOBACCO USE: ICD-10-CM

## 2023-09-07 DIAGNOSIS — Z12.5 SPECIAL SCREENING FOR MALIGNANT NEOPLASM OF PROSTATE: ICD-10-CM

## 2023-09-07 DIAGNOSIS — E78.00 PURE HYPERCHOLESTEROLEMIA: ICD-10-CM

## 2023-09-07 DIAGNOSIS — I63.9 CEREBROVASCULAR ACCIDENT (CVA), UNSPECIFIED MECHANISM (HCC): ICD-10-CM

## 2023-09-07 DIAGNOSIS — Z13.31 SCREENING FOR DEPRESSION: ICD-10-CM

## 2023-09-07 DIAGNOSIS — E55.9 VITAMIN D DEFICIENCY: ICD-10-CM

## 2023-09-07 DIAGNOSIS — Z00.00 MEDICARE ANNUAL WELLNESS VISIT, SUBSEQUENT: ICD-10-CM

## 2023-09-07 PROCEDURE — 3017F COLORECTAL CA SCREEN DOC REV: CPT | Performed by: FAMILY MEDICINE

## 2023-09-07 PROCEDURE — G0403 EKG FOR INITIAL PREVENT EXAM: HCPCS | Performed by: FAMILY MEDICINE

## 2023-09-07 PROCEDURE — G0296 VISIT TO DETERM LDCT ELIG: HCPCS | Performed by: FAMILY MEDICINE

## 2023-09-07 PROCEDURE — 1123F ACP DISCUSS/DSCN MKR DOCD: CPT | Performed by: FAMILY MEDICINE

## 2023-09-07 PROCEDURE — G0402 INITIAL PREVENTIVE EXAM: HCPCS | Performed by: FAMILY MEDICINE

## 2023-09-07 RX ORDER — METOPROLOL SUCCINATE 25 MG/1
25 TABLET, EXTENDED RELEASE ORAL DAILY
Qty: 90 TABLET | Refills: 3 | Status: SHIPPED | OUTPATIENT
Start: 2023-09-07

## 2023-09-07 RX ORDER — ATORVASTATIN CALCIUM 80 MG/1
80 TABLET, FILM COATED ORAL DAILY
Qty: 90 TABLET | Refills: 3 | Status: SHIPPED | OUTPATIENT
Start: 2023-09-07

## 2023-09-07 RX ORDER — LOSARTAN POTASSIUM 50 MG/1
50 TABLET ORAL DAILY
Qty: 90 TABLET | Refills: 3 | Status: SHIPPED | OUTPATIENT
Start: 2023-09-07

## 2023-09-07 RX ORDER — FAMOTIDINE 40 MG/1
40 TABLET, FILM COATED ORAL DAILY
Qty: 90 TABLET | Refills: 3 | Status: SHIPPED | OUTPATIENT
Start: 2023-09-07

## 2023-09-07 ASSESSMENT — VISUAL ACUITY
OD_CC: 20/20
OS_CC: 20/20

## 2023-09-07 ASSESSMENT — PATIENT HEALTH QUESTIONNAIRE - PHQ9
SUM OF ALL RESPONSES TO PHQ9 QUESTIONS 1 & 2: 0
SUM OF ALL RESPONSES TO PHQ QUESTIONS 1-9: 0
1. LITTLE INTEREST OR PLEASURE IN DOING THINGS: 0
SUM OF ALL RESPONSES TO PHQ QUESTIONS 1-9: 0
2. FEELING DOWN, DEPRESSED OR HOPELESS: 0
SUM OF ALL RESPONSES TO PHQ QUESTIONS 1-9: 0
SUM OF ALL RESPONSES TO PHQ QUESTIONS 1-9: 0

## 2024-09-26 ENCOUNTER — OFFICE VISIT (OUTPATIENT)
Dept: FAMILY MEDICINE CLINIC | Facility: CLINIC | Age: 69
End: 2024-09-26

## 2024-09-26 VITALS
SYSTOLIC BLOOD PRESSURE: 116 MMHG | BODY MASS INDEX: 29.68 KG/M2 | DIASTOLIC BLOOD PRESSURE: 68 MMHG | WEIGHT: 212 LBS | HEIGHT: 71 IN | HEART RATE: 58 BPM

## 2024-09-26 DIAGNOSIS — J30.2 SEASONAL ALLERGIC RHINITIS, UNSPECIFIED TRIGGER: ICD-10-CM

## 2024-09-26 DIAGNOSIS — R53.83 OTHER FATIGUE: ICD-10-CM

## 2024-09-26 DIAGNOSIS — I10 PRIMARY HYPERTENSION: ICD-10-CM

## 2024-09-26 DIAGNOSIS — K21.9 GASTROESOPHAGEAL REFLUX DISEASE WITHOUT ESOPHAGITIS: ICD-10-CM

## 2024-09-26 DIAGNOSIS — J31.2 CHRONIC SORE THROAT: ICD-10-CM

## 2024-09-26 DIAGNOSIS — E55.9 VITAMIN D DEFICIENCY: Primary | ICD-10-CM

## 2024-09-26 DIAGNOSIS — E78.00 PURE HYPERCHOLESTEROLEMIA: ICD-10-CM

## 2024-09-26 DIAGNOSIS — Z00.00 MEDICARE ANNUAL WELLNESS VISIT, SUBSEQUENT: ICD-10-CM

## 2024-09-26 DIAGNOSIS — Z12.5 PROSTATE CANCER SCREENING: ICD-10-CM

## 2024-09-26 DIAGNOSIS — E78.00 HYPERCHOLESTEREMIA: ICD-10-CM

## 2024-09-26 LAB
25(OH)D3 SERPL-MCNC: 29.6 NG/ML (ref 30–100)
ALBUMIN SERPL-MCNC: 4 G/DL (ref 3.2–4.6)
ALBUMIN/GLOB SERPL: 1.2 (ref 1–1.9)
ALP SERPL-CCNC: 68 U/L (ref 40–129)
ALT SERPL-CCNC: 21 U/L (ref 8–55)
ANION GAP SERPL CALC-SCNC: 10 MMOL/L (ref 9–18)
AST SERPL-CCNC: 19 U/L (ref 15–37)
BILIRUB SERPL-MCNC: 0.8 MG/DL (ref 0–1.2)
BILIRUBIN, URINE, POC: NEGATIVE
BLOOD URINE, POC: NEGATIVE
BUN SERPL-MCNC: 23 MG/DL (ref 8–23)
CALCIUM SERPL-MCNC: 9.7 MG/DL (ref 8.8–10.2)
CHLORIDE SERPL-SCNC: 104 MMOL/L (ref 98–107)
CHOLEST SERPL-MCNC: 143 MG/DL (ref 0–200)
CO2 SERPL-SCNC: 28 MMOL/L (ref 20–28)
CREAT SERPL-MCNC: 0.98 MG/DL (ref 0.8–1.3)
GLOBULIN SER CALC-MCNC: 3.2 G/DL (ref 2.3–3.5)
GLUCOSE SERPL-MCNC: 114 MG/DL (ref 70–99)
GLUCOSE URINE, POC: NEGATIVE
GRANS ABSOLUTE, POC: 4.1 K/UL
GRANULOCYTES %, POC: 52.9 %
HDLC SERPL-MCNC: 46 MG/DL (ref 40–60)
HDLC SERPL: 3.1 (ref 0–5)
HEMATOCRIT, POC: 49.3 %
HEMOGLOBIN, POC: 16.1 G/DL
KETONES, URINE, POC: NEGATIVE
LDLC SERPL CALC-MCNC: 80 MG/DL (ref 0–100)
LEUKOCYTE ESTERASE, URINE, POC: NEGATIVE
LYMPHOCYTE %, POC: 35.2 %
LYMPHS ABSOLUTE, POC: 2.7 K/UL
MCH, POC: 30.8 PG (ref 20–?)
MCHC, POC: 32.7
MCV, POC: 94.5
MONOCYTE %, POC: 11.9 %
MONOCYTE, ABSOLUTE POC: 0.9 K/UL
MPV, POC: 9 FL
NITRITE, URINE, POC: NEGATIVE
PH, URINE, POC: 5.5 (ref 4.6–8)
PLATELET COUNT, POC: 221 K/UL
POTASSIUM SERPL-SCNC: 4.4 MMOL/L (ref 3.5–5.1)
PROT SERPL-MCNC: 7.2 G/DL (ref 6.3–8.2)
PROTEIN,URINE, POC: NEGATIVE
PSA SERPL-MCNC: 4.9 NG/ML (ref 0–4)
RBC, POC: 5.22 M/UL
RDW, POC: 13 %
SODIUM SERPL-SCNC: 142 MMOL/L (ref 136–145)
SPECIFIC GRAVITY, URINE, POC: 1.02 (ref 1–1.03)
TRIGL SERPL-MCNC: 81 MG/DL (ref 0–150)
TSH, 3RD GENERATION: 1.33 UIU/ML (ref 0.27–4.2)
URINALYSIS CLARITY, POC: CLEAR
URINALYSIS COLOR, POC: YELLOW
UROBILINOGEN, POC: NORMAL
VLDLC SERPL CALC-MCNC: 16 MG/DL (ref 6–23)
WBC, POC: 7.7 K/UL

## 2024-09-26 RX ORDER — LOSARTAN POTASSIUM 50 MG/1
50 TABLET ORAL DAILY
Qty: 90 TABLET | Refills: 3 | Status: SHIPPED | OUTPATIENT
Start: 2024-09-26

## 2024-09-26 RX ORDER — TRIAMCINOLONE ACETONIDE 40 MG/ML
40 INJECTION, SUSPENSION INTRA-ARTICULAR; INTRAMUSCULAR ONCE
Status: COMPLETED | OUTPATIENT
Start: 2024-09-26 | End: 2024-09-26

## 2024-09-26 RX ORDER — FLUTICASONE PROPIONATE 50 MCG
2 SPRAY, SUSPENSION (ML) NASAL DAILY
Qty: 16 G | Refills: 0 | Status: SHIPPED | OUTPATIENT
Start: 2024-09-26

## 2024-09-26 RX ORDER — FAMOTIDINE 40 MG/1
40 TABLET, FILM COATED ORAL DAILY
Qty: 90 TABLET | Refills: 3 | Status: SHIPPED | OUTPATIENT
Start: 2024-09-26

## 2024-09-26 RX ORDER — FEXOFENADINE HCL 180 MG/1
180 TABLET ORAL DAILY
Qty: 30 TABLET | Refills: 0 | Status: SHIPPED | OUTPATIENT
Start: 2024-09-26 | End: 2024-10-26

## 2024-09-26 RX ORDER — ATORVASTATIN CALCIUM 80 MG/1
80 TABLET, FILM COATED ORAL DAILY
Qty: 90 TABLET | Refills: 3 | Status: SHIPPED | OUTPATIENT
Start: 2024-09-26

## 2024-09-26 RX ADMIN — TRIAMCINOLONE ACETONIDE 40 MG: 40 INJECTION, SUSPENSION INTRA-ARTICULAR; INTRAMUSCULAR at 09:06

## 2024-09-26 SDOH — ECONOMIC STABILITY: INCOME INSECURITY: HOW HARD IS IT FOR YOU TO PAY FOR THE VERY BASICS LIKE FOOD, HOUSING, MEDICAL CARE, AND HEATING?: NOT HARD AT ALL

## 2024-09-26 SDOH — ECONOMIC STABILITY: FOOD INSECURITY: WITHIN THE PAST 12 MONTHS, YOU WORRIED THAT YOUR FOOD WOULD RUN OUT BEFORE YOU GOT MONEY TO BUY MORE.: NEVER TRUE

## 2024-09-26 SDOH — ECONOMIC STABILITY: FOOD INSECURITY: WITHIN THE PAST 12 MONTHS, THE FOOD YOU BOUGHT JUST DIDN'T LAST AND YOU DIDN'T HAVE MONEY TO GET MORE.: NEVER TRUE

## 2024-09-26 ASSESSMENT — PATIENT HEALTH QUESTIONNAIRE - PHQ9
2. FEELING DOWN, DEPRESSED OR HOPELESS: NOT AT ALL
SUM OF ALL RESPONSES TO PHQ QUESTIONS 1-9: 0
SUM OF ALL RESPONSES TO PHQ QUESTIONS 1-9: 0
SUM OF ALL RESPONSES TO PHQ9 QUESTIONS 1 & 2: 0
SUM OF ALL RESPONSES TO PHQ QUESTIONS 1-9: 0
1. LITTLE INTEREST OR PLEASURE IN DOING THINGS: NOT AT ALL
SUM OF ALL RESPONSES TO PHQ QUESTIONS 1-9: 0

## 2024-10-07 ENCOUNTER — TELEMEDICINE (OUTPATIENT)
Dept: FAMILY MEDICINE CLINIC | Facility: CLINIC | Age: 69
End: 2024-10-07
Payer: MEDICARE

## 2024-10-07 DIAGNOSIS — R97.20 ELEVATED PSA: Primary | ICD-10-CM

## 2024-10-07 DIAGNOSIS — Z12.5 ENCOUNTER FOR SCREENING FOR MALIGNANT NEOPLASM OF PROSTATE: ICD-10-CM

## 2024-10-07 DIAGNOSIS — R73.09 ELEVATED GLUCOSE: ICD-10-CM

## 2024-10-07 DIAGNOSIS — Z12.11 SPECIAL SCREENING FOR MALIGNANT NEOPLASMS, COLON: ICD-10-CM

## 2024-10-07 PROCEDURE — 99442 PR PHYS/QHP TELEPHONE EVALUATION 11-20 MIN: CPT | Performed by: FAMILY MEDICINE

## 2024-10-07 RX ORDER — CIPROFLOXACIN 500 MG/1
TABLET, FILM COATED ORAL
Qty: 35 TABLET | Refills: 0 | Status: SHIPPED | OUTPATIENT
Start: 2024-10-07

## 2024-10-07 ASSESSMENT — ENCOUNTER SYMPTOMS
VOMITING: 0
SHORTNESS OF BREATH: 0
NAUSEA: 0

## 2024-10-07 NOTE — PROGRESS NOTES
8:27 AM   Result Value Ref Range    Cholesterol, Total 143 0 - 200 MG/DL    Triglycerides 81 0 - 150 MG/DL    HDL 46 40 - 60 MG/DL    LDL Cholesterol 80 0 - 100 MG/DL    VLDL Cholesterol Calculated 16 6 - 23 MG/DL    Chol/HDL Ratio 3.1 0.0 - 5.0     Comprehensive Metabolic Panel    Collection Time: 09/26/24  8:27 AM   Result Value Ref Range    Sodium 142 136 - 145 mmol/L    Potassium 4.4 3.5 - 5.1 mmol/L    Chloride 104 98 - 107 mmol/L    CO2 28 20 - 28 mmol/L    Anion Gap 10 9 - 18 mmol/L    Glucose 114 (H) 70 - 99 mg/dL    BUN 23 8 - 23 MG/DL    Creatinine 0.98 0.80 - 1.30 MG/DL    Est, Glom Filt Rate 84 >60 ml/min/1.73m2    Calcium 9.7 8.8 - 10.2 MG/DL    Total Bilirubin 0.8 0.0 - 1.2 MG/DL    ALT 21 8 - 55 U/L    AST 19 15 - 37 U/L    Alk Phosphatase 68 40 - 129 U/L    Total Protein 7.2 6.3 - 8.2 g/dL    Albumin 4.0 3.2 - 4.6 g/dL    Globulin 3.2 2.3 - 3.5 g/dL    Albumin/Globulin Ratio 1.2 1.0 - 1.9         ASSESSMENT and PLAN    Visit Diagnoses and Associated Orders       Elevated PSA    -  Primary    ciprofloxacin (CIPRO) 500 MG tablet [14129]      PSA Screening [ Custom]   - Future Order         Elevated glucose        Hemoglobin A1C [72186 Custom]   - Future Order         Encounter for screening for malignant neoplasm of prostate        PSA Screening [ Custom]   - Future Order         Special screening for malignant neoplasms, colon        [facility] - Colonoscopy [OFF501 Custom]                       Diagnosis Orders   1. Elevated PSA  ciprofloxacin (CIPRO) 500 MG tablet    PSA Screening      2. Elevated glucose  Hemoglobin A1C      3. Encounter for screening for malignant neoplasm of prostate  PSA Screening      4. Special screening for malignant neoplasms, colon  [facility] - Colonoscopy      , Diagnoses and all orders for this visit:    Elevated PSA  -     ciprofloxacin (CIPRO) 500 MG tablet; Take 1 Tablet by mouth twice daily for 1 week, then

## 2024-10-08 ENCOUNTER — OFFICE VISIT (OUTPATIENT)
Dept: ENT CLINIC | Age: 69
End: 2024-10-08
Payer: MEDICARE

## 2024-10-08 VITALS
SYSTOLIC BLOOD PRESSURE: 116 MMHG | DIASTOLIC BLOOD PRESSURE: 68 MMHG | BODY MASS INDEX: 29.71 KG/M2 | HEIGHT: 71 IN | WEIGHT: 212.2 LBS

## 2024-10-08 DIAGNOSIS — R13.12 OROPHARYNGEAL DYSPHAGIA: Chronic | ICD-10-CM

## 2024-10-08 DIAGNOSIS — H61.23 BILATERAL IMPACTED CERUMEN: Primary | Chronic | ICD-10-CM

## 2024-10-08 DIAGNOSIS — K21.9 LARYNGOPHARYNGEAL REFLUX (LPR): Chronic | ICD-10-CM

## 2024-10-08 DIAGNOSIS — K21.9 GASTRO-ESOPHAGEAL REFLUX DISEASE WITHOUT ESOPHAGITIS: ICD-10-CM

## 2024-10-08 DIAGNOSIS — J38.01 PARESIS OF RIGHT VOCAL CORD: ICD-10-CM

## 2024-10-08 PROCEDURE — 3017F COLORECTAL CA SCREEN DOC REV: CPT | Performed by: PHYSICIAN ASSISTANT

## 2024-10-08 PROCEDURE — G8427 DOCREV CUR MEDS BY ELIG CLIN: HCPCS | Performed by: PHYSICIAN ASSISTANT

## 2024-10-08 PROCEDURE — G8419 CALC BMI OUT NRM PARAM NOF/U: HCPCS | Performed by: PHYSICIAN ASSISTANT

## 2024-10-08 PROCEDURE — 31575 DIAGNOSTIC LARYNGOSCOPY: CPT | Performed by: PHYSICIAN ASSISTANT

## 2024-10-08 PROCEDURE — 3078F DIAST BP <80 MM HG: CPT | Performed by: PHYSICIAN ASSISTANT

## 2024-10-08 PROCEDURE — 69210 REMOVE IMPACTED EAR WAX UNI: CPT | Performed by: PHYSICIAN ASSISTANT

## 2024-10-08 PROCEDURE — 3074F SYST BP LT 130 MM HG: CPT | Performed by: PHYSICIAN ASSISTANT

## 2024-10-08 PROCEDURE — G8484 FLU IMMUNIZE NO ADMIN: HCPCS | Performed by: PHYSICIAN ASSISTANT

## 2024-10-08 PROCEDURE — 1036F TOBACCO NON-USER: CPT | Performed by: PHYSICIAN ASSISTANT

## 2024-10-08 PROCEDURE — 1123F ACP DISCUSS/DSCN MKR DOCD: CPT | Performed by: PHYSICIAN ASSISTANT

## 2024-10-08 PROCEDURE — 99204 OFFICE O/P NEW MOD 45 MIN: CPT | Performed by: PHYSICIAN ASSISTANT

## 2024-10-08 RX ORDER — AZELASTINE 1 MG/ML
2 SPRAY, METERED NASAL 2 TIMES DAILY
Qty: 120 ML | Refills: 1 | Status: SHIPPED | OUTPATIENT
Start: 2024-10-08

## 2024-10-08 ASSESSMENT — ENCOUNTER SYMPTOMS
EYES NEGATIVE: 1
SORE THROAT: 1
RESPIRATORY NEGATIVE: 1
ALLERGIC/IMMUNOLOGIC NEGATIVE: 1
GASTROINTESTINAL NEGATIVE: 1

## 2024-10-08 NOTE — PATIENT INSTRUCTIONS
Omeprazole 20 mg every morning  Pepcid 20 mg before bed    Ernie will be called into your pharmacy, can use am and pm. But start with before bed.     Use ear drops in your right ear twice a day for 10-14 days.  For ear wax removal

## 2024-10-08 NOTE — PROGRESS NOTES
Thomas Clark . is a 69 y.o. male presents today with c/o sore throat. It feels like something catches or gets stuck behind the right tonsil, back of tongue/ throat. He notes it worse at night when he gets obstructive congestion from the nose. He was on daily Afrin for a year before switching to Flonase. He takes Pepcid for GERD and that helps his heartburn. Pt denies dysphagia, dyspnea, and chronic coughing or throat clearing. He notes his airway feels like it closes off when he is numbed at the dentist or here. He previously smoked and stopped in 2005 with the assistance of medication. He feels like the lump in his throat is more on the right side and he can't get it to come up or down.    Chief Complaint   Patient presents with    New Patient    Pharyngitis     Chronic sore throat.  Patient states that it feels like there is something stuck right behind his tongue.  States that it is worse at night.  States that he is always having nasal congestion and PND.  He feels like the PND gets caught in his throat and won't go down or come up.  Denies trouble swallowing and choking.  Has been ongoing for about a year.  States that he used to use a spray (afrin) for a year.  States that it was the only thing that helps him breathe.         Patient Active Problem List   Diagnosis    CAD (coronary artery disease)    ED (erectile dysfunction)    Angina pectoris, unspecified (HCC)    GERD (gastroesophageal reflux disease)    HTN (hypertension)    Stroke ([facility])    Hyperlipidemia    History of percutaneous coronary intervention    NSTEMI (non-ST elevated myocardial infarction) ([facility])        Reviewed and updated this visit by provider:  Tobacco  Allergies  Meds  Problems  Med Hx  Surg Hx  Fam Hx         Review of Systems   Constitutional: Negative.    HENT:  Positive for congestion and sore throat.         Globus sensation   Eyes: Negative.    Respiratory: Negative.     Cardiovascular: Negative.

## 2024-10-18 DIAGNOSIS — J30.2 SEASONAL ALLERGIC RHINITIS, UNSPECIFIED TRIGGER: ICD-10-CM

## 2024-10-18 RX ORDER — FLUTICASONE PROPIONATE 50 MCG
2 SPRAY, SUSPENSION (ML) NASAL DAILY
Refills: 1 | OUTPATIENT
Start: 2024-10-18

## 2024-10-23 DIAGNOSIS — J30.2 SEASONAL ALLERGIC RHINITIS, UNSPECIFIED TRIGGER: ICD-10-CM

## 2024-10-23 RX ORDER — FEXOFENADINE HCL 180 MG/1
180 TABLET ORAL DAILY
Qty: 30 TABLET | Refills: 5 | Status: SHIPPED | OUTPATIENT
Start: 2024-10-23 | End: 2025-04-21

## 2024-10-25 ENCOUNTER — HOSPITAL ENCOUNTER (OUTPATIENT)
Dept: GENERAL RADIOLOGY | Age: 69
Discharge: HOME | End: 2024-10-28
Payer: MEDICARE

## 2024-10-25 DIAGNOSIS — R13.12 OROPHARYNGEAL DYSPHAGIA: Chronic | ICD-10-CM

## 2024-10-25 DIAGNOSIS — K21.9 GASTRO-ESOPHAGEAL REFLUX DISEASE WITHOUT ESOPHAGITIS: ICD-10-CM

## 2024-10-25 PROCEDURE — 74230 X-RAY XM SWLNG FUNCJ C+: CPT

## 2024-10-25 PROCEDURE — 2500000003 HC RX 250 WO HCPCS: Performed by: PHYSICIAN ASSISTANT

## 2024-10-25 PROCEDURE — 92611 MOTION FLUOROSCOPY/SWALLOW: CPT

## 2024-10-25 RX ADMIN — BARIUM SULFATE 30 ML: 0.81 POWDER, FOR SUSPENSION ORAL at 13:20

## 2024-10-25 RX ADMIN — BARIUM SULFATE 30 ML: 400 PASTE ORAL at 13:20

## 2024-10-25 NOTE — THERAPY EVALUATION
Thomas Clark Sr.  : 1955  Primary: [facility]  Secondary:   MRN: [id number] [facility] RADIOLOGY  [address] [place] [address]  Phone: [phone]    Visit Info:    Date 10/25/2024   SPEECH LANGUAGE PATHOLOGY:   MODIFIED BARIUM SWALLOW STUDY     Appt Desk   Episode      Treatment Diagnosis:    Oropharyngeal dysphagia  Gastro-esophageal reflux disease without esophagitis    Medical/Referring Diagnosis:  Oropharyngeal dysphagia [R13.12]  Gastro-esophageal reflux disease without esophagitis [K21.9]  Referring Physician:  Anisha Ingram PA  Radiologist: Dr. Antonio  Fluoroscopy completed by: Dr. Marisela RODRIGUEZ Orders: Modified Barium Swallow  Date of Onset:  No data recorded   Allergies:  Patient has no known allergies.    PAST MEDICAL HISTORY:   Mr. Clark is a 69 y.o. male who  has a past medical history of CAD (coronary artery disease), ED (erectile dysfunction), GERD (gastroesophageal reflux disease), HTN (hypertension), Hyperlipidemia, and Stroke (HCC).  He also  has a past surgical history that includes pr unlisted procedure cardiac surgery; Inguinal hernia repair (Right); and Colonoscopy (2023).    ASSESSMENT/PLAN OF CARE   Based on the objective data described below, Mr. Clark presents with oropharyngeal swallow within functional limits. Timely swallow of all consistencies with adequate hyolaryngeal elevation and excursion. No laryngeal penetration or aspiration observed and no pharyngeal residue after swallow.    RECOMMENDATIONS AND PLANNED INTERVENTIONS:  DIET:   regular  thin liquids    MEDICATIONS: as tolerated    Compensatory Swallowing Strategies/Modifications:  Upright for all PO  Small bites and sips  Slow rate of PO intake  Remain upright for 20-30 min after any PO      GENERAL    Patient reports chronic globus sensation, but describes it occurs in upper portion of pharynx almost in sinuses. He reports it is worse at night when supine. ENT

## 2024-11-06 ENCOUNTER — HOSPITAL ENCOUNTER (OUTPATIENT)
Dept: CT IMAGING | Age: 69
Discharge: HOME | End: 2024-11-09
Payer: MEDICARE

## 2024-11-06 DIAGNOSIS — J38.01 PARESIS OF RIGHT VOCAL CORD: ICD-10-CM

## 2024-11-06 LAB — CREAT BLD-MCNC: 1.24 MG/DL (ref 0.8–1.5)

## 2024-11-06 PROCEDURE — 70491 CT SOFT TISSUE NECK W/DYE: CPT

## 2024-11-06 PROCEDURE — 6360000004 HC RX CONTRAST MEDICATION: Performed by: PHYSICIAN ASSISTANT

## 2024-11-06 PROCEDURE — 82565 ASSAY OF CREATININE: CPT

## 2024-11-06 RX ORDER — IOPAMIDOL 755 MG/ML
80 INJECTION, SOLUTION INTRAVASCULAR
Status: COMPLETED | OUTPATIENT
Start: 2024-11-06 | End: 2024-11-06

## 2024-11-06 RX ADMIN — IOPAMIDOL 80 ML: 755 INJECTION, SOLUTION INTRAVENOUS at 16:40

## 2024-11-07 ENCOUNTER — LAB (OUTPATIENT)
Dept: FAMILY MEDICINE CLINIC | Facility: CLINIC | Age: 69
End: 2024-11-07

## 2024-11-07 DIAGNOSIS — R73.09 ELEVATED GLUCOSE: ICD-10-CM

## 2024-11-07 DIAGNOSIS — R97.20 ELEVATED PSA: ICD-10-CM

## 2024-11-07 DIAGNOSIS — Z12.5 ENCOUNTER FOR SCREENING FOR MALIGNANT NEOPLASM OF PROSTATE: ICD-10-CM

## 2024-11-07 LAB
EST. AVERAGE GLUCOSE BLD GHB EST-MCNC: 129 MG/DL
HBA1C MFR BLD: 6.1 % (ref 0–5.6)
PSA SERPL-MCNC: 2.4 NG/ML (ref 0–4)

## 2024-11-11 ENCOUNTER — OFFICE VISIT (OUTPATIENT)
Dept: ENT CLINIC | Age: 69
End: 2024-11-11
Payer: MEDICARE

## 2024-11-11 VITALS
BODY MASS INDEX: 29.9 KG/M2 | DIASTOLIC BLOOD PRESSURE: 68 MMHG | SYSTOLIC BLOOD PRESSURE: 116 MMHG | WEIGHT: 213.6 LBS | HEIGHT: 71 IN

## 2024-11-11 DIAGNOSIS — J38.01 PARESIS OF RIGHT VOCAL CORD: Chronic | ICD-10-CM

## 2024-11-11 DIAGNOSIS — H61.21 IMPACTED CERUMEN OF RIGHT EAR: Primary | Chronic | ICD-10-CM

## 2024-11-11 PROCEDURE — 3078F DIAST BP <80 MM HG: CPT | Performed by: PHYSICIAN ASSISTANT

## 2024-11-11 PROCEDURE — 1160F RVW MEDS BY RX/DR IN RCRD: CPT | Performed by: PHYSICIAN ASSISTANT

## 2024-11-11 PROCEDURE — 99213 OFFICE O/P EST LOW 20 MIN: CPT | Performed by: PHYSICIAN ASSISTANT

## 2024-11-11 PROCEDURE — 1036F TOBACCO NON-USER: CPT | Performed by: PHYSICIAN ASSISTANT

## 2024-11-11 PROCEDURE — 69210 REMOVE IMPACTED EAR WAX UNI: CPT | Performed by: PHYSICIAN ASSISTANT

## 2024-11-11 PROCEDURE — G8419 CALC BMI OUT NRM PARAM NOF/U: HCPCS | Performed by: PHYSICIAN ASSISTANT

## 2024-11-11 PROCEDURE — G8427 DOCREV CUR MEDS BY ELIG CLIN: HCPCS | Performed by: PHYSICIAN ASSISTANT

## 2024-11-11 PROCEDURE — 1126F AMNT PAIN NOTED NONE PRSNT: CPT | Performed by: PHYSICIAN ASSISTANT

## 2024-11-11 PROCEDURE — 3074F SYST BP LT 130 MM HG: CPT | Performed by: PHYSICIAN ASSISTANT

## 2024-11-11 PROCEDURE — 1123F ACP DISCUSS/DSCN MKR DOCD: CPT | Performed by: PHYSICIAN ASSISTANT

## 2024-11-11 PROCEDURE — 1159F MED LIST DOCD IN RCRD: CPT | Performed by: PHYSICIAN ASSISTANT

## 2024-11-11 PROCEDURE — 3017F COLORECTAL CA SCREEN DOC REV: CPT | Performed by: PHYSICIAN ASSISTANT

## 2024-11-11 PROCEDURE — G8484 FLU IMMUNIZE NO ADMIN: HCPCS | Performed by: PHYSICIAN ASSISTANT

## 2024-11-11 ASSESSMENT — ENCOUNTER SYMPTOMS
RESPIRATORY NEGATIVE: 1
ALLERGIC/IMMUNOLOGIC NEGATIVE: 1
GASTROINTESTINAL NEGATIVE: 1
EYES NEGATIVE: 1

## 2024-11-11 NOTE — PROGRESS NOTES
aspect of the right EAC.  The TM's were then inspected with the above findings.    Complications: The patient had no complications during or immediately following the procedure. The patient tolerated the procedure well and left the clinic in good condition.      Assessment/Plan:  1. Impacted cerumen of right ear  2. Paresis of right vocal cord    Patient tolerated ear wax removal well, both ears were able to be completely cleaned. Recommend weekly use of mineral oil to prevent reaccumulation. Apply 2 drops of oil into each ear at bed time once weekly. Pt will return for debridement as needed.     CT scan results still pending but no clear identifiable reason for the globus sensation.      Pt doesn't feel better from a pnd or LPR standpoint, will continue on meds longer.       No follow-ups on file.    Patient agrees with this plan.        JAMAICA Craig    This note was generated using voice recognition software, please excuse any typos.

## 2024-11-14 ENCOUNTER — TELEMEDICINE (OUTPATIENT)
Dept: FAMILY MEDICINE CLINIC | Facility: CLINIC | Age: 69
End: 2024-11-14

## 2024-11-14 DIAGNOSIS — R97.20 ELEVATED PSA: Primary | ICD-10-CM

## 2024-11-14 DIAGNOSIS — R73.09 ELEVATED GLUCOSE: ICD-10-CM

## 2024-11-14 ASSESSMENT — ENCOUNTER SYMPTOMS
VOMITING: 0
NAUSEA: 0
SHORTNESS OF BREATH: 0

## 2024-11-14 NOTE — PROGRESS NOTES
PROGRESS NOTE    SUBJECTIVE:   Thomas Clark Sr. is a 69 y.o. male seen for a follow up visit regarding the following chief complaint:     Chief Complaint   Patient presents with   • Follow-up           HPI patient is doing a phone call visit to go over his lab results without any new complaintsThomas Clark Sr. is a 69 y.o. male evaluated via telephone on 11/14/2024 for Follow-up  .        Total Time: minutes: 11-20 minutes    Thomas Clark Sr. was evaluated through a synchronous (real-time) audio encounter. Patient identification was verified at the start of the visit. He (or guardian if applicable) is aware that this is a billable service, which includes applicable co-pays. This visit was conducted with the patient's (and/or legal guardian's) verbal consent. He has not had a related appointment within my department in the past 7 days or scheduled within the next 24 hours.   The patient was located at Home: [address] [place] [address].  The provider was in his office at [address] [place] [address].    Note: not billable if this call serves to triage the patient into an appointment for the relevant concern         Past Medical History, Past Surgical History, Family history, Social History, and Medications were all reviewed with the patient today and updated as necessary.       Current Outpatient Medications   Medication Sig Dispense Refill   • fexofenadine (ALLEGRA) 180 MG tablet Take 1 tablet by mouth daily 30 tablet 5   • azelastine (ASTELIN) 0.1 % nasal spray 2 sprays by Nasal route 2 times daily Use in each nostril as directed 120 mL 1   • famotidine (PEPCID) 40 MG tablet Take 1 tablet by mouth daily 90 tablet 3   • atorvastatin (LIPITOR) 80 MG tablet Take 1 tablet by mouth daily 90 tablet 3   • losartan (COZAAR) 50 MG tablet Take 1 tablet by mouth daily 90 tablet 3   • fluticasone (FLONASE) 50 MCG/ACT nasal spray 2 sprays by Each Nostril route daily 16 g 0   • metoprolol

## 2024-11-21 ENCOUNTER — TELEPHONE (OUTPATIENT)
Dept: ENT CLINIC | Age: 69
End: 2024-11-21

## 2024-11-21 NOTE — TELEPHONE ENCOUNTER
CT was negative, spoke with Dr. oRdriges to consider further etiology for globus sensation. He would like pt to return to see him in person. MA's called to set up timing and offer that.

## (undated) DEVICE — SINGLE PORT MANIFOLD: Brand: NEPTUNE 2

## (undated) DEVICE — FORCEPS BX L240CM JAW DIA2.8MM L CAP W/ NDL MIC MESH TOOTH

## (undated) DEVICE — SYRINGE MED 3ML CLR PLAS STD N CTRL LUERLOCK TIP DISP

## (undated) DEVICE — GAUZE,SPONGE,4"X4",12PLY,WOVEN,NS,LF: Brand: MEDLINE

## (undated) DEVICE — CONTAINER FORMALIN PREFILLED 10% NBF 60ML

## (undated) DEVICE — YANKAUER,BULB TIP,W/O VENT,RIGID,STERILE: Brand: MEDLINE

## (undated) DEVICE — CANNULA NSL ORAL AD FOR CAPNOFLEX CO2 O2 AIRLFE

## (undated) DEVICE — SYRINGE MED 10ML LUERLOCK TIP W/O SFTY DISP

## (undated) DEVICE — SYRINGE, LUER SLIP, STERILE, 60ML: Brand: MEDLINE

## (undated) DEVICE — CONNECTOR TBNG OD5-7MM O2 END DISP

## (undated) DEVICE — NEEDLE SYR 18GA L1.5IN RED PLAS HUB S STL BLNT FILL W/O

## (undated) DEVICE — LUBE JELLY FOIL PACK 1.4 OZ: Brand: MEDLINE INDUSTRIES, INC.

## (undated) DEVICE — AIRLIFE™ OXYGEN TUBING 7 FEET (2.1 M) CRUSH RESISTANT OXYGEN TUBING, VINYL TIPPED: Brand: AIRLIFE™

## (undated) DEVICE — KENDALL RADIOLUCENT FOAM MONITORING ELECTRODE RECTANGULAR SHAPE: Brand: KENDALL